# Patient Record
Sex: FEMALE | Race: OTHER | HISPANIC OR LATINO
[De-identification: names, ages, dates, MRNs, and addresses within clinical notes are randomized per-mention and may not be internally consistent; named-entity substitution may affect disease eponyms.]

---

## 2019-06-20 ENCOUNTER — RECORD ABSTRACTING (OUTPATIENT)
Age: 44
End: 2019-06-20

## 2019-06-20 DIAGNOSIS — Z82.49 FAMILY HISTORY OF ISCHEMIC HEART DISEASE AND OTHER DISEASES OF THE CIRCULATORY SYSTEM: ICD-10-CM

## 2019-06-20 DIAGNOSIS — Z86.69 PERSONAL HISTORY OF OTHER DISEASES OF THE NERVOUS SYSTEM AND SENSE ORGANS: ICD-10-CM

## 2019-06-20 PROBLEM — Z00.00 ENCOUNTER FOR PREVENTIVE HEALTH EXAMINATION: Status: ACTIVE | Noted: 2019-06-20

## 2019-06-20 RX ORDER — OMEPRAZOLE 20 MG/1
20 CAPSULE, DELAYED RELEASE ORAL
Refills: 0 | Status: ACTIVE | COMMUNITY

## 2019-06-20 RX ORDER — LEVOCETIRIZINE DIHYDROCHLORIDE 5 MG/1
TABLET ORAL DAILY
Refills: 0 | Status: ACTIVE | COMMUNITY

## 2019-06-20 RX ORDER — TRAMADOL HYDROCHLORIDE 25 MG/1
TABLET, COATED ORAL
Refills: 0 | Status: ACTIVE | COMMUNITY

## 2019-06-20 RX ORDER — HYDROXYCHLOROQUINE SULFATE 200 MG/1
200 TABLET ORAL DAILY
Refills: 0 | Status: ACTIVE | COMMUNITY

## 2019-09-19 ENCOUNTER — APPOINTMENT (OUTPATIENT)
Dept: ENDOCRINOLOGY | Facility: CLINIC | Age: 44
End: 2019-09-19
Payer: MEDICAID

## 2019-09-19 VITALS
HEART RATE: 68 BPM | DIASTOLIC BLOOD PRESSURE: 70 MMHG | BODY MASS INDEX: 23.39 KG/M2 | OXYGEN SATURATION: 98 % | SYSTOLIC BLOOD PRESSURE: 112 MMHG | HEIGHT: 63 IN | WEIGHT: 132 LBS

## 2019-09-19 DIAGNOSIS — E03.9 HYPOTHYROIDISM, UNSPECIFIED: ICD-10-CM

## 2019-09-19 DIAGNOSIS — E55.9 VITAMIN D DEFICIENCY, UNSPECIFIED: ICD-10-CM

## 2019-09-19 DIAGNOSIS — J30.9 ALLERGIC RHINITIS, UNSPECIFIED: ICD-10-CM

## 2019-09-19 DIAGNOSIS — Z87.19 PERSONAL HISTORY OF OTHER DISEASES OF THE DIGESTIVE SYSTEM: ICD-10-CM

## 2019-09-19 PROCEDURE — 99214 OFFICE O/P EST MOD 30 MIN: CPT

## 2019-09-19 RX ORDER — ALENDRONATE SODIUM 70 MG/1
70 TABLET ORAL
Refills: 0 | Status: COMPLETED | COMMUNITY
End: 2019-09-19

## 2019-09-19 RX ORDER — HYDROXYZINE HCL 25 MG
25 TABLET ORAL
Refills: 0 | Status: ACTIVE | COMMUNITY

## 2019-09-19 RX ORDER — CETIRIZINE HYDROCHLORIDE 10 MG/1
10 TABLET, COATED ORAL DAILY
Qty: 30 | Refills: 3 | Status: ACTIVE | COMMUNITY
Start: 2019-09-19 | End: 1900-01-01

## 2019-09-19 RX ORDER — HYDROXYZINE HCL 25 MG
25 TABLET ORAL
Refills: 0 | Status: COMPLETED | COMMUNITY
End: 2019-09-19

## 2019-09-19 RX ORDER — MULTIVIT-MIN/FOLIC/VIT K/LYCOP 400-300MCG
50 MCG TABLET ORAL
Qty: 30 | Refills: 5 | Status: ACTIVE | COMMUNITY
Start: 2019-09-19 | End: 1900-01-01

## 2019-09-19 RX ORDER — LEVOTHYROXINE SODIUM 0.03 MG/1
25 TABLET ORAL
Qty: 30 | Refills: 5 | Status: ACTIVE | COMMUNITY

## 2019-09-19 NOTE — PAST MEDICAL HISTORY
[Postmenopausal] : The patient is postmenopausal [Definite ___ (Date)] : the last menstrual period was [unfilled] [Total Preg ___] : G[unfilled] [Live Births ___] : P[unfilled]  [Full Term ___] : Full Term: [unfilled] [Abortions ___] : Abortions:[unfilled] [Living ___] : Living: [unfilled] [AB Spont ___] : miscarriages: [unfilled]

## 2019-09-19 NOTE — PHYSICAL EXAM
[Alert] : alert [No Acute Distress] : no acute distress [Well Nourished] : well nourished [Well Developed] : well developed [Healthy Appearance] : healthy appearance [Normal Sclera/Conjunctiva] : normal sclera/conjunctiva [EOMI] : extra ocular movement intact [No Proptosis] : no proptosis [Normal Oropharynx] : the oropharynx was normal [Thyroid Not Enlarged] : the thyroid was not enlarged [No Thyroid Nodules] : there were no palpable thyroid nodules [No Respiratory Distress] : no respiratory distress [No Accessory Muscle Use] : no accessory muscle use [Clear to Auscultation] : lungs were clear to auscultation bilaterally [Normal Rate] : heart rate was normal  [Normal S1, S2] : normal S1 and S2 [Regular Rhythm] : with a regular rhythm [Pedal Pulses Normal] : the pedal pulses are present [No Edema] : there was no peripheral edema [Normal Bowel Sounds] : normal bowel sounds [Not Tender] : non-tender [Soft] : abdomen soft [Not Distended] : not distended [Post Cervical Nodes] : posterior cervical nodes [Anterior Cervical Nodes] : anterior cervical nodes [Axillary Nodes] : axillary nodes [Normal] : normal and non tender [No Spinal Tenderness] : no spinal tenderness [Spine Straight] : spine straight [Normal Gait] : normal gait [Normal Strength/Tone] : muscle strength and tone were normal [No Rash] : no rash [Acanthosis Nigricans] : no acanthosis nigricans [Normal Reflexes] : deep tendon reflexes were 2+ and symmetric [No Tremors] : no tremors [Oriented x3] : oriented to person, place, and time [de-identified] : hypothyroid

## 2019-09-19 NOTE — ASSESSMENT
[FreeTextEntry1] : patient concern of some swelling in face in am and leg swelling in feet at night. Discussed about allergic rhinitis  [Carbohydrate Consistent Diet] : carbohydrate consistent diet [Hypoglycemia Management] : hypoglycemia management [Glucagon Use] : glucagon use [Diabetes Foot Care] : diabetes foot care [Long Term Vascular Complications] : long term vascular complications of diabetes [Importance of Diet and Exercise] : importance of diet and exercise to improve glycemic control, achieve weight loss and improve cardiovascular health

## 2019-09-19 NOTE — DATA REVIEWED
[FreeTextEntry1] : glucose 91, chol 192, hdl 52, ldl 124, trig 66, tsh 1.75, free t4 1.2, t3 free 3.2 hgb a1c 5.4 vitamin d 23

## 2019-09-19 NOTE — REVIEW OF SYSTEMS
[Negative] : Heme/Lymph [de-identified] : swelling of face in the morning, but goes away in the afternoon [de-identified] : hypothyroid

## 2020-02-03 ENCOUNTER — APPOINTMENT (OUTPATIENT)
Dept: ENDOCRINOLOGY | Facility: CLINIC | Age: 45
End: 2020-02-03

## 2020-11-16 ENCOUNTER — TRANSCRIPTION ENCOUNTER (OUTPATIENT)
Age: 45
End: 2020-11-16

## 2022-05-14 ENCOUNTER — EMERGENCY (EMERGENCY)
Facility: HOSPITAL | Age: 47
LOS: 0 days | Discharge: HOME | End: 2022-05-14
Attending: EMERGENCY MEDICINE | Admitting: EMERGENCY MEDICINE
Payer: MEDICAID

## 2022-05-14 ENCOUNTER — NON-APPOINTMENT (OUTPATIENT)
Age: 47
End: 2022-05-14

## 2022-05-14 VITALS
SYSTOLIC BLOOD PRESSURE: 103 MMHG | TEMPERATURE: 98 F | WEIGHT: 139.99 LBS | DIASTOLIC BLOOD PRESSURE: 51 MMHG | RESPIRATION RATE: 18 BRPM | OXYGEN SATURATION: 98 % | HEART RATE: 82 BPM

## 2022-05-14 VITALS
SYSTOLIC BLOOD PRESSURE: 105 MMHG | TEMPERATURE: 98 F | DIASTOLIC BLOOD PRESSURE: 61 MMHG | RESPIRATION RATE: 18 BRPM | HEART RATE: 80 BPM | OXYGEN SATURATION: 97 %

## 2022-05-14 DIAGNOSIS — M54.50 LOW BACK PAIN, UNSPECIFIED: ICD-10-CM

## 2022-05-14 DIAGNOSIS — Z86.79 PERSONAL HISTORY OF OTHER DISEASES OF THE CIRCULATORY SYSTEM: ICD-10-CM

## 2022-05-14 DIAGNOSIS — Z98.890 OTHER SPECIFIED POSTPROCEDURAL STATES: Chronic | ICD-10-CM

## 2022-05-14 DIAGNOSIS — M79.7 FIBROMYALGIA: ICD-10-CM

## 2022-05-14 PROCEDURE — 99284 EMERGENCY DEPT VISIT MOD MDM: CPT

## 2022-05-14 RX ORDER — LIDOCAINE 4 G/100G
1 CREAM TOPICAL ONCE
Refills: 0 | Status: COMPLETED | OUTPATIENT
Start: 2022-05-14 | End: 2022-05-14

## 2022-05-14 RX ORDER — KETOROLAC TROMETHAMINE 30 MG/ML
60 SYRINGE (ML) INJECTION ONCE
Refills: 0 | Status: DISCONTINUED | OUTPATIENT
Start: 2022-05-14 | End: 2022-05-14

## 2022-05-14 RX ORDER — METHOCARBAMOL 500 MG/1
750 TABLET, FILM COATED ORAL ONCE
Refills: 0 | Status: COMPLETED | OUTPATIENT
Start: 2022-05-14 | End: 2022-05-14

## 2022-05-14 RX ADMIN — METHOCARBAMOL 750 MILLIGRAM(S): 500 TABLET, FILM COATED ORAL at 17:53

## 2022-05-14 RX ADMIN — LIDOCAINE 1 PATCH: 4 CREAM TOPICAL at 18:49

## 2022-05-14 RX ADMIN — Medication 60 MILLIGRAM(S): at 18:23

## 2022-05-14 RX ADMIN — Medication 60 MILLIGRAM(S): at 17:50

## 2022-05-14 NOTE — ED ADULT NURSE NOTE - IN THE PAST 12 MONTHS HAVE YOU USED DRUGS OTHER THAN THOSE REQUIRED FOR MEDICAL REASON?
Requested updates within Care Everywhere.  Patient's chart was reviewed for overdue OPAL topics.  Immunizations reconciled.      
No

## 2022-05-14 NOTE — ED PROVIDER NOTE - PATIENT PORTAL LINK FT
You can access the FollowMyHealth Patient Portal offered by [facility] by registering at the following website: [email or web address]. By joining [employer]’s FollowMyHealth portal, you will also be able to view your health information using other applications (apps) compatible with our system.

## 2022-05-14 NOTE — ED PROVIDER NOTE - OBJECTIVE STATEMENT
45 y/o female with hx of fibromyalgia presents to the Ed for evaluation of back pain which started today while changing sheets which started today. patient unable to find relief with movement, change in position, seated or standing. patient denies any pain radiating down legs, tingling or weakness to legs. no bowel/bladder incontinence. no rashes. patient c/o pain sharp in nature worse with movement. 45 y/o female with hx of fibromyalgia, brain aneurysm presents to the Ed for evaluation of back pain which started today while changing sheets which started today. patient unable to find relief with movement, change in position, seated or standing. patient denies any pain radiating down legs, tingling or weakness to legs. no bowel/bladder incontinence. no rashes. patient c/o pain sharp in nature worse with movement.

## 2022-05-14 NOTE — ED PROVIDER NOTE - NS ED MD DISPO DISCHARGE CCDA
c/o constipation x 3 days with LUQ stomach pain, i've been drinking prune juice and when I pooped I saw some blood around 4pm Patient/Caregiver provided printed discharge information.

## 2022-05-14 NOTE — ED PROVIDER NOTE - NS ED ROS FT
Review of Systems    Constitutional: (-) fever/ chills (-)loss of appetite or  weight loss  Cardiovascular: (-) chest pain, (-) syncope (-) palpitations  Respiratory: (-) cough, (-) shortness of breath  Gastrointestinal: (-) vomiting, (-) diarrhea (-) abdominal pain  : (-) dysuria , hematuria   neck: (-) neck pain or stiffness  Musculoskeletal:  (+) back pain, (-) joint pain   Integumentary: (-) rash, (-) swelling  Neurological: (-) headache, (-) altered mental status

## 2022-05-14 NOTE — ED PROVIDER NOTE - CLINICAL SUMMARY MEDICAL DECISION MAKING FREE TEXT BOX
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  No trauma or fall.  No red flags.  On exam nontoxic, mild diffuse low back tenderness, not specifically in the midline, no redness or step-offs.  5 out of 5 strength bilateral lower extremities, sensation intact 2+ bilateral patellar and Achilles DTR, 2+ DP pulse.  Able to ambulate.  Given pain medicine with improvement. In my opinion, based on current evaluation, an acute medical or surgical emergency does not appear to be occurring at this time and I feel that the pt is stable for further outpatient work up and/or treatment. Return precautions discussed.

## 2022-05-14 NOTE — ED PROVIDER NOTE - NS ED ATTENDING STATEMENT MOD
This was a shared visit with the NOEMÍ. I reviewed and verified the documentation and independently performed the documented:

## 2022-05-14 NOTE — ED PROVIDER NOTE - NSICDXPASTMEDICALHX_GEN_ALL_CORE_FT
PAST MEDICAL HISTORY:  Fibromyalgia      PAST MEDICAL HISTORY:  Brain aneurysm     Fibromyalgia

## 2022-05-14 NOTE — ED PROVIDER NOTE - PHYSICAL EXAMINATION
Vital Signs: I have reviewed the initial vital signs.  Constitutional: well-nourished, no acute distress, normocephalic  Eyes: PERRLA, EOMI, clear conjunctiva  ENT: MMM  Cardiovascular: regular rate, regular rhythm, no murmur appreciated  Respiratory: unlabored respiratory effort, clear to auscultation bilaterally  Gastrointestinal: soft, non-tender, non-distended  abdomen  Musculoskeletal: supple neck, no vertebral tenderness, neg slr, pelvis stable,  no bony tenderness  Integumentary: warm, dry, no rash  Neurologic: awake, alert, cranial nerves II-XII grossly intact, extremities’ motor and sensory functions grossly intact, no focal deficits

## 2022-05-14 NOTE — ED PROVIDER NOTE - NSFOLLOWUPCLINICS_GEN_ALL_ED_FT
[facility] ([facility])  Rehabilitation  [facility] [address], [address] [place] [address]  Phone: [phone]  Fax:

## 2023-02-13 ENCOUNTER — OFFICE (OUTPATIENT)
Dept: URBAN - METROPOLITAN AREA CLINIC 76 | Facility: CLINIC | Age: 48
Setting detail: OPHTHALMOLOGY
End: 2023-02-13
Payer: COMMERCIAL

## 2023-02-13 DIAGNOSIS — H35.412: ICD-10-CM

## 2023-02-13 DIAGNOSIS — H16.223: ICD-10-CM

## 2023-02-13 DIAGNOSIS — I77.0: ICD-10-CM

## 2023-02-13 DIAGNOSIS — H11.153: ICD-10-CM

## 2023-02-13 PROCEDURE — 92083 EXTENDED VISUAL FIELD XM: CPT | Performed by: OPHTHALMOLOGY

## 2023-02-13 PROCEDURE — 92014 COMPRE OPH EXAM EST PT 1/>: CPT | Performed by: OPHTHALMOLOGY

## 2023-02-13 PROCEDURE — 92133 CPTRZD OPH DX IMG PST SGM ON: CPT | Performed by: OPHTHALMOLOGY

## 2023-02-13 ASSESSMENT — KERATOMETRY
OS_K2POWER_DIOPTERS: 43.00
OD_AXISANGLE_DEGREES: 84
OD_K1POWER_DIOPTERS: 42.75
OS_AXISANGLE_DEGREES: 115
OS_K1POWER_DIOPTERS: 42.25
OD_K2POWER_DIOPTERS: 43.00

## 2023-02-13 ASSESSMENT — REFRACTION_AUTOREFRACTION
OD_SPHERE: -0.50
OD_AXIS: 78
OS_SPHERE: -0.50
OD_CYLINDER: -0.25
OS_CYLINDER: SPH

## 2023-02-13 ASSESSMENT — REFRACTION_MANIFEST
OS_VA1: 20/20
OD_VA1: 20/20
OD_VA1: 20/20
OS_VA1: 20/25
OD_ADD: +1.50
OD_SPHERE: PLANO
OS_SPHERE: PLANO
OS_SPHERE: PLANO
OS_ADD: +1.00
OD_ADD: +1.00
OD_SPHERE: PLANO
OS_ADD: +1.50

## 2023-02-13 ASSESSMENT — TONOMETRY
OD_IOP_MMHG: 14
OS_IOP_MMHG: 15

## 2023-02-13 ASSESSMENT — AXIALLENGTH_DERIVED: OD_AL: 24.0747

## 2023-02-13 ASSESSMENT — CONFRONTATIONAL VISUAL FIELD TEST (CVF)
OD_FINDINGS: FULL
OS_FINDINGS: FULL

## 2023-02-13 ASSESSMENT — VISUAL ACUITY
OS_BCVA: 20/20-2
OD_BCVA: 20/20-2

## 2023-02-13 ASSESSMENT — SUPERFICIAL PUNCTATE KERATITIS (SPK)
OD_SPK: 2+
OS_SPK: 2+

## 2023-02-13 ASSESSMENT — SPHEQUIV_DERIVED: OD_SPHEQUIV: -0.625

## 2023-08-14 ENCOUNTER — OFFICE (OUTPATIENT)
Dept: URBAN - METROPOLITAN AREA CLINIC 76 | Facility: CLINIC | Age: 48
Setting detail: OPHTHALMOLOGY
End: 2023-08-14
Payer: COMMERCIAL

## 2023-08-14 ENCOUNTER — RX ONLY (RX ONLY)
Age: 48
End: 2023-08-14

## 2023-08-14 DIAGNOSIS — I77.0: ICD-10-CM

## 2023-08-14 DIAGNOSIS — H52.4: ICD-10-CM

## 2023-08-14 DIAGNOSIS — H16.223: ICD-10-CM

## 2023-08-14 DIAGNOSIS — H57.813: ICD-10-CM

## 2023-08-14 PROCEDURE — 92015 DETERMINE REFRACTIVE STATE: CPT | Performed by: OPHTHALMOLOGY

## 2023-08-14 PROCEDURE — 92012 INTRM OPH EXAM EST PATIENT: CPT | Performed by: OPHTHALMOLOGY

## 2023-08-14 ASSESSMENT — REFRACTION_AUTOREFRACTION
OD_CYLINDER: -0.25
OS_SPHERE: PLANO
OD_SPHERE: -0.50
OD_AXIS: 38
OS_AXIS: 1
OS_CYLINDER: -0.50

## 2023-08-14 ASSESSMENT — CONFRONTATIONAL VISUAL FIELD TEST (CVF)
OS_FINDINGS: FULL
OD_FINDINGS: FULL

## 2023-08-14 ASSESSMENT — REFRACTION_MANIFEST
OD_SPHERE: PLANO
OD_ADD: +1.50
OS_VA1: 20/20
OD_VA1: 20/20
OD_SPHERE: PLANO
OD_VA1: 20/20
OS_AXIS: 180
OS_SPHERE: PLANO
OS_ADD: +2.00
OD_CYLINDER: SPH
OS_SPHERE: PLANO
OS_CYLINDER: -0.25
OS_ADD: +1.50
OD_ADD: +2.00
OS_VA1: 20/20

## 2023-08-14 ASSESSMENT — SPHEQUIV_DERIVED: OD_SPHEQUIV: -0.625

## 2023-08-14 ASSESSMENT — KERATOMETRY
OD_AXISANGLE_DEGREES: 87
OD_K2POWER_DIOPTERS: 43.25
OS_K1POWER_DIOPTERS: 42.50
OD_K1POWER_DIOPTERS: 42.50
OS_K2POWER_DIOPTERS: 43.00
OS_AXISANGLE_DEGREES: 103

## 2023-08-14 ASSESSMENT — VISUAL ACUITY
OS_BCVA: 20/20-2
OD_BCVA: 20/20-2

## 2023-08-14 ASSESSMENT — REFRACTION_CURRENTRX
OD_CYLINDER: SPH
OS_SPHERE: +0.50
OD_OVR_VA: 20/
OS_OVR_VA: 20/
OD_SPHERE: +0.50
OS_CYLINDER: SPH

## 2023-08-14 ASSESSMENT — TONOMETRY
OD_IOP_MMHG: 14
OS_IOP_MMHG: 19

## 2023-08-14 ASSESSMENT — AXIALLENGTH_DERIVED: OD_AL: 24.0747

## 2023-08-14 ASSESSMENT — SUPERFICIAL PUNCTATE KERATITIS (SPK)
OS_SPK: 2+
OD_SPK: 2+

## 2023-08-25 ENCOUNTER — OFFICE (OUTPATIENT)
Dept: URBAN - METROPOLITAN AREA CLINIC 76 | Facility: CLINIC | Age: 48
Setting detail: OPHTHALMOLOGY
End: 2023-08-25
Payer: COMMERCIAL

## 2023-08-25 DIAGNOSIS — H16.223: ICD-10-CM

## 2023-08-25 DIAGNOSIS — I77.0: ICD-10-CM

## 2023-08-25 DIAGNOSIS — H57.813: ICD-10-CM

## 2023-08-25 PROCEDURE — 92083 EXTENDED VISUAL FIELD XM: CPT | Performed by: OPHTHALMOLOGY

## 2023-08-25 PROCEDURE — 92012 INTRM OPH EXAM EST PATIENT: CPT | Performed by: OPHTHALMOLOGY

## 2023-08-25 ASSESSMENT — CONFRONTATIONAL VISUAL FIELD TEST (CVF)
OD_FINDINGS: FULL
OS_FINDINGS: FULL

## 2023-08-25 ASSESSMENT — KERATOMETRY
OD_K2POWER_DIOPTERS: 43.25
OS_K1POWER_DIOPTERS: 42.50
OS_AXISANGLE_DEGREES: 103
OD_K1POWER_DIOPTERS: 42.50
OD_AXISANGLE_DEGREES: 87
OS_K2POWER_DIOPTERS: 43.00

## 2023-08-25 ASSESSMENT — AXIALLENGTH_DERIVED: OD_AL: 24.0747

## 2023-08-25 ASSESSMENT — REFRACTION_MANIFEST
OS_SPHERE: PLANO
OS_VA1: 20/20
OD_SPHERE: PLANO
OS_VA1: 20/20
OD_VA1: 20/20
OD_ADD: +2.00
OD_VA1: 20/20
OD_ADD: +1.50
OD_SPHERE: PLANO
OD_CYLINDER: SPH
OS_AXIS: 180
OS_SPHERE: PLANO
OS_CYLINDER: -0.25
OS_ADD: +2.00
OS_ADD: +1.50

## 2023-08-25 ASSESSMENT — REFRACTION_CURRENTRX
OD_SPHERE: +0.50
OS_SPHERE: +0.50
OD_OVR_VA: 20/
OS_OVR_VA: 20/
OS_CYLINDER: SPH
OD_CYLINDER: SPH

## 2023-08-25 ASSESSMENT — TEAR BREAK UP TIME (TBUT)
OD_TBUT: 4 SECONDS
OS_TBUT: 3 SECONDS

## 2023-08-25 ASSESSMENT — TONOMETRY
OD_IOP_MMHG: 14
OS_IOP_MMHG: 14

## 2023-08-25 ASSESSMENT — REFRACTION_AUTOREFRACTION
OS_AXIS: 1
OS_CYLINDER: -0.50
OD_AXIS: 38
OD_CYLINDER: -0.25
OD_SPHERE: -0.50
OS_SPHERE: PLANO

## 2023-08-25 ASSESSMENT — SUPERFICIAL PUNCTATE KERATITIS (SPK)
OD_SPK: 2+
OS_SPK: 2+

## 2023-08-25 ASSESSMENT — VISUAL ACUITY
OD_BCVA: 20/20
OS_BCVA: 20/20

## 2023-08-25 ASSESSMENT — SPHEQUIV_DERIVED: OD_SPHEQUIV: -0.625

## 2024-02-07 ENCOUNTER — RX ONLY (RX ONLY)
Age: 49
End: 2024-02-07

## 2024-02-07 ENCOUNTER — OFFICE (OUTPATIENT)
Dept: URBAN - METROPOLITAN AREA CLINIC 76 | Facility: CLINIC | Age: 49
Setting detail: OPHTHALMOLOGY
End: 2024-02-07
Payer: COMMERCIAL

## 2024-02-07 DIAGNOSIS — H35.412: ICD-10-CM

## 2024-02-07 DIAGNOSIS — H11.153: ICD-10-CM

## 2024-02-07 DIAGNOSIS — H16.223: ICD-10-CM

## 2024-02-07 DIAGNOSIS — I77.0: ICD-10-CM

## 2024-02-07 DIAGNOSIS — H57.813: ICD-10-CM

## 2024-02-07 PROCEDURE — 92014 COMPRE OPH EXAM EST PT 1/>: CPT | Performed by: OPHTHALMOLOGY

## 2024-02-07 PROCEDURE — 92083 EXTENDED VISUAL FIELD XM: CPT | Performed by: OPHTHALMOLOGY

## 2024-02-07 ASSESSMENT — REFRACTION_MANIFEST
OS_ADD: +1.50
OS_CYLINDER: ---
OD_ADD: +2.00
OS_CYLINDER: -0.25
OD_SPHERE: PLANO
OS_AXIS: 180
OS_ADD: +2.00
OD_SPHERE: PLANO
OD_SPHERE: PLANO
OD_VA1: 20/20
OD_VA1: 20/25
OS_VA1: 20/20
OD_CYLINDER: SPH
OD_VA1: 20/20
OS_SPHERE: PLANO
OD_ADD: +1.50
OS_VA1: 20/25
OD_CYLINDER: ---
OD_ADD: +2.00
OS_ADD: +2.00
OS_SPHERE: PLANO
OS_VA1: 20/20
OS_SPHERE: PLANO

## 2024-02-07 ASSESSMENT — SPHEQUIV_DERIVED
OD_SPHEQUIV: -0.125
OS_SPHEQUIV: -0.375

## 2024-02-07 ASSESSMENT — REFRACTION_CURRENTRX
OD_OVR_VA: 20/
OS_OVR_VA: 20/
OS_CYLINDER: SPH
OS_SPHERE: +0.50
OD_SPHERE: +0.50
OD_CYLINDER: SPH

## 2024-02-07 ASSESSMENT — SUPERFICIAL PUNCTATE KERATITIS (SPK)
OS_SPK: 2+
OD_SPK: 2+

## 2024-02-07 ASSESSMENT — CONFRONTATIONAL VISUAL FIELD TEST (CVF)
OD_FINDINGS: FULL
OS_FINDINGS: FULL

## 2024-02-07 ASSESSMENT — TEAR BREAK UP TIME (TBUT)
OS_TBUT: 3 SECONDS
OD_TBUT: 4 SECONDS

## 2024-02-07 ASSESSMENT — REFRACTION_AUTOREFRACTION
OS_AXIS: 174
OS_SPHERE: -0.25
OS_CYLINDER: -0.25
OD_SPHERE: 0.00
OD_CYLINDER: -0.25
OD_AXIS: 71

## 2024-08-09 ENCOUNTER — OFFICE (OUTPATIENT)
Dept: URBAN - METROPOLITAN AREA CLINIC 76 | Facility: CLINIC | Age: 49
Setting detail: OPHTHALMOLOGY
End: 2024-08-09
Payer: MEDICAID

## 2024-08-09 DIAGNOSIS — H16.223: ICD-10-CM

## 2024-08-09 DIAGNOSIS — H52.4: ICD-10-CM

## 2024-08-09 DIAGNOSIS — I77.0: ICD-10-CM

## 2024-08-09 DIAGNOSIS — H35.412: ICD-10-CM

## 2024-08-09 PROCEDURE — 92083 EXTENDED VISUAL FIELD XM: CPT | Performed by: OPHTHALMOLOGY

## 2024-08-09 PROCEDURE — 92015 DETERMINE REFRACTIVE STATE: CPT | Performed by: OPHTHALMOLOGY

## 2024-08-09 PROCEDURE — 99213 OFFICE O/P EST LOW 20 MIN: CPT | Performed by: OPHTHALMOLOGY

## 2024-08-09 ASSESSMENT — CONFRONTATIONAL VISUAL FIELD TEST (CVF)
OD_FINDINGS: FULL
OS_FINDINGS: FULL

## 2025-02-04 ENCOUNTER — OFFICE (OUTPATIENT)
Dept: URBAN - METROPOLITAN AREA CLINIC 76 | Facility: CLINIC | Age: 50
Setting detail: OPHTHALMOLOGY
End: 2025-02-04
Payer: MEDICAID

## 2025-02-04 DIAGNOSIS — H57.813: ICD-10-CM

## 2025-02-04 DIAGNOSIS — I77.0: ICD-10-CM

## 2025-02-04 DIAGNOSIS — H52.4: ICD-10-CM

## 2025-02-04 DIAGNOSIS — H11.153: ICD-10-CM

## 2025-02-04 DIAGNOSIS — H16.223: ICD-10-CM

## 2025-02-04 PROCEDURE — 92083 EXTENDED VISUAL FIELD XM: CPT | Performed by: INTERNAL MEDICINE

## 2025-02-04 PROCEDURE — 99213 OFFICE O/P EST LOW 20 MIN: CPT | Performed by: INTERNAL MEDICINE

## 2025-02-04 PROCEDURE — 92015 DETERMINE REFRACTIVE STATE: CPT | Performed by: INTERNAL MEDICINE

## 2025-02-04 ASSESSMENT — REFRACTION_MANIFEST
OD_ADD: +2.00
OS_CYLINDER: ---
OD_VA1: 20/20
OD_SPHERE: PLANO
OS_ADD: +2.00
OD_CYLINDER: ---
OS_VA1: 20/20
OS_VA1: 20/25
OS_SPHERE: PLANO
OD_SPHERE: PLANO
OD_VA1: 20/20
OD_VA1: 20/20
OS_CYLINDER: -0.25
OD_CYLINDER: SPH
OD_ADD: +2.00
OD_CYLINDER: ---
OD_ADD: +2.50
OS_ADD: +2.50
OS_ADD: +2.00
OS_SPHERE: PLANO
OS_SPHERE: PLANO
OD_SPHERE: PLANO
OD_VA1: 20/25
OS_VA1: 20/30
OS_VA1: 20/20
OS_CYLINDER: ---
OD_VA1: 20/25
OS_AXIS: 180
OD_ADD: +2.00
OD_CYLINDER: ---
OS_ADD: +1.50
OD_ADD: +1.50
OD_SPHERE: PLANO
OS_CYLINDER: ---
OS_ADD: +2.00
OS_VA1: 20/20
OS_SPHERE: PLANO
OD_SPHERE: PLANO
OS_SPHERE: PLANO

## 2025-02-04 ASSESSMENT — CONFRONTATIONAL VISUAL FIELD TEST (CVF)
OD_FINDINGS: FULL
OS_FINDINGS: FULL

## 2025-02-04 ASSESSMENT — TONOMETRY
OD_IOP_MMHG: 16
OS_IOP_MMHG: 16

## 2025-02-04 ASSESSMENT — REFRACTION_AUTOREFRACTION
OS_CYLINDER: -0.25
OD_SPHERE: 0.00
OS_SPHERE: 0.00
OD_CYLINDER: -0.25
OD_AXIS: 051
OS_AXIS: 041

## 2025-02-04 ASSESSMENT — KERATOMETRY
OD_K1POWER_DIOPTERS: 42.50
OD_K2POWER_DIOPTERS: 43.00
OD_AXISANGLE_DEGREES: 088
OS_AXISANGLE_DEGREES: 103
METHOD_AUTO_MANUAL: AUTO
OS_K2POWER_DIOPTERS: 43.00
OS_K1POWER_DIOPTERS: 42.50

## 2025-02-04 ASSESSMENT — REFRACTION_CURRENTRX
OS_CYLINDER: SPH
OD_SPHERE: +0.50
OS_SPHERE: +0.50
OD_CYLINDER: SPH
OS_OVR_VA: 20/
OD_OVR_VA: 20/

## 2025-02-04 ASSESSMENT — TEAR BREAK UP TIME (TBUT)
OS_TBUT: 3 SECONDS
OD_TBUT: 4 SECONDS

## 2025-02-04 ASSESSMENT — VISUAL ACUITY
OS_BCVA: 20/25
OD_BCVA: 20/30

## 2025-02-04 ASSESSMENT — SUPERFICIAL PUNCTATE KERATITIS (SPK)
OD_SPK: 2+
OS_SPK: 2+

## 2025-07-25 ENCOUNTER — OFFICE (OUTPATIENT)
Dept: URBAN - METROPOLITAN AREA CLINIC 76 | Facility: CLINIC | Age: 50
Setting detail: OPHTHALMOLOGY
End: 2025-07-25
Payer: COMMERCIAL

## 2025-07-25 ENCOUNTER — APPOINTMENT (OUTPATIENT)
Dept: GASTROENTEROLOGY | Facility: CLINIC | Age: 50
End: 2025-07-25

## 2025-07-25 DIAGNOSIS — H16.223: ICD-10-CM

## 2025-07-25 DIAGNOSIS — H53.40: ICD-10-CM

## 2025-07-25 PROBLEM — H10.45 ALLERGIC CONJUNCTIVITIS ; BOTH EYES: Status: ACTIVE | Noted: 2025-07-25

## 2025-07-25 PROCEDURE — 92083 EXTENDED VISUAL FIELD XM: CPT | Performed by: OPHTHALMOLOGY

## 2025-07-25 PROCEDURE — 92014 COMPRE OPH EXAM EST PT 1/>: CPT | Performed by: OPHTHALMOLOGY

## 2025-07-25 PROCEDURE — 92133 CPTRZD OPH DX IMG PST SGM ON: CPT | Performed by: OPHTHALMOLOGY

## 2025-07-25 ASSESSMENT — KERATOMETRY
OS_K2POWER_DIOPTERS: 43.00
OD_AXISANGLE_DEGREES: 088
OD_K1POWER_DIOPTERS: 42.50
OS_K1POWER_DIOPTERS: 42.50
OS_AXISANGLE_DEGREES: 103
OD_K2POWER_DIOPTERS: 43.00
METHOD_AUTO_MANUAL: AUTO

## 2025-07-25 ASSESSMENT — REFRACTION_MANIFEST
OS_SPHERE: PLANO
OD_ADD: +2.00
OS_VA1: 20/20
OD_ADD: +1.50
OD_CYLINDER: SPH
OS_SPHERE: PLANO
OS_VA1: 20/30
OS_AXIS: 180
OS_CYLINDER: ---
OS_SPHERE: PLANO
OD_SPHERE: PLANO
OD_CYLINDER: ---
OS_ADD: +1.50
OD_SPHERE: PLANO
OS_ADD: +2.50
OS_SPHERE: PLANO
OS_VA1: 20/20
OS_ADD: +2.00
OD_VA1: 20/20
OS_ADD: +2.00
OS_CYLINDER: -0.25
OS_VA1: 20/25
OD_ADD: +2.00
OS_VA1: 20/20
OS_CYLINDER: ---
OD_SPHERE: PLANO
OS_SPHERE: PLANO
OD_ADD: +2.00
OD_SPHERE: PLANO
OD_ADD: +2.50
OD_VA1: 20/25
OS_CYLINDER: ---
OS_ADD: +2.00
OD_SPHERE: PLANO
OD_CYLINDER: ---
OD_CYLINDER: ---
OD_VA1: 20/20
OD_VA1: 20/20
OD_VA1: 20/25

## 2025-07-25 ASSESSMENT — REFRACTION_CURRENTRX
OS_CYLINDER: SPH
OS_OVR_VA: 20/
OD_CYLINDER: SPH
OD_OVR_VA: 20/
OS_SPHERE: +0.50
OD_SPHERE: +0.50

## 2025-07-25 ASSESSMENT — REFRACTION_AUTOREFRACTION
OS_SPHERE: 0.00
OD_CYLINDER: -0.25
OS_AXIS: 041
OD_AXIS: 051
OD_SPHERE: 0.00
OS_CYLINDER: -0.25

## 2025-07-25 ASSESSMENT — CONFRONTATIONAL VISUAL FIELD TEST (CVF)
OS_FINDINGS: FULL
OD_FINDINGS: FULL

## 2025-07-25 ASSESSMENT — SUPERFICIAL PUNCTATE KERATITIS (SPK)
OD_SPK: 1+ 2+
OS_SPK: 1+ 2+

## 2025-07-25 ASSESSMENT — VISUAL ACUITY
OD_BCVA: 20/25-1
OS_BCVA: 20/20-2

## 2025-07-25 ASSESSMENT — TEAR BREAK UP TIME (TBUT)
OD_TBUT: 2+
OS_TBUT: 2+